# Patient Record
Sex: FEMALE | Race: WHITE | NOT HISPANIC OR LATINO | Employment: OTHER | URBAN - METROPOLITAN AREA
[De-identification: names, ages, dates, MRNs, and addresses within clinical notes are randomized per-mention and may not be internally consistent; named-entity substitution may affect disease eponyms.]

---

## 2024-01-04 NOTE — TELEPHONE ENCOUNTER
"Action 1/4/24 MV 11.11am   Action Taken IB sent to [facility] to see if any neuro notes     Action 1/4/24 MV 1.34pm   Action Taken Per [facility]:  \"There may be nothing she is calling me at 1 to finish registration, Dr. Gates told me to add her on and didn't specify what the appointment was for. I will find out more. \"    Waiting on more information.         RECORDS RECEIVED FROM: self   REASON FOR VISIT: ataxia   Date of Appt: 1/5/24   NOTES (FOR ALL VISITS) STATUS DETAILS   OFFICE NOTE from referring provider In process    OFFICE NOTE from other specialist In process    DISCHARGE SUMMARY from hospital In process    DISCHARGE REPORT from the ER In process    OPERATIVE REPORT In process    [facility] (MS ONLY) In process    EMG In process    EEG In process    MEDICATION LIST In process    IMAGING  (FOR ALL VISITS)     LUMBAR PUNCTURE In process    KYRA SCAN (MOVEMENT) In process    ULTRASOUND (CAROTID BILAT) *VASCULAR* In process    MRI (HEAD, NECK, SPINE) In process    CT (HEAD, NECK, SPINE) In process [facility]:  CT Head 1/27/00        "

## 2024-01-05 ENCOUNTER — OFFICE VISIT (OUTPATIENT)
Dept: NEUROLOGY | Facility: CLINIC | Age: 64
End: 2024-01-05
Payer: COMMERCIAL

## 2024-01-05 ENCOUNTER — PRE VISIT (OUTPATIENT)
Dept: NEUROLOGY | Facility: CLINIC | Age: 64
End: 2024-01-05

## 2024-01-05 VITALS
OXYGEN SATURATION: 99 % | RESPIRATION RATE: 18 BRPM | HEIGHT: 63 IN | SYSTOLIC BLOOD PRESSURE: 129 MMHG | HEART RATE: 73 BPM | WEIGHT: 144.3 LBS | BODY MASS INDEX: 25.57 KG/M2 | DIASTOLIC BLOOD PRESSURE: 79 MMHG

## 2024-01-05 DIAGNOSIS — F32.A DEPRESSION, UNSPECIFIED DEPRESSION TYPE: Primary | ICD-10-CM

## 2024-01-05 PROCEDURE — 99417 PROLNG OP E/M EACH 15 MIN: CPT | Performed by: PSYCHIATRY & NEUROLOGY

## 2024-01-05 PROCEDURE — 99205 OFFICE O/P NEW HI 60 MIN: CPT | Performed by: PSYCHIATRY & NEUROLOGY

## 2024-01-05 RX ORDER — LORAZEPAM 0.5 MG/1
1 TABLET ORAL AT BEDTIME
COMMUNITY
Start: 2023-12-29
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
## 2024-05-07 ENCOUNTER — TRANSFERRED RECORDS (OUTPATIENT)
Dept: HEALTH INFORMATION MANAGEMENT | Facility: CLINIC | Age: 64
End: 2024-05-07
Payer: COMMERCIAL

## 2024-05-09 ENCOUNTER — DOCUMENTATION ONLY (OUTPATIENT)
Dept: NEUROLOGY | Facility: CLINIC | Age: 64
End: 2024-05-09
Payer: COMMERCIAL

## 2024-05-09 NOTE — PROGRESS NOTES
Received office note from allergy asthma  Records Date of service: 5/7/24  Copy has been sent to scanning and sent to nurse

## 2024-06-06 ENCOUNTER — TRANSFERRED RECORDS (OUTPATIENT)
Dept: HEALTH INFORMATION MANAGEMENT | Facility: CLINIC | Age: 64
End: 2024-06-06
Payer: COMMERCIAL

## 2024-06-21 ENCOUNTER — DOCUMENTATION ONLY (OUTPATIENT)
Dept: NEUROLOGY | Facility: CLINIC | Age: 64
End: 2024-06-21
Payer: COMMERCIAL

## 2024-06-21 NOTE — PROGRESS NOTES
[place] office note from [facility]    Records Date of service: 6/7/24  Copy has been sent to scanning and encounter routed to nurse .

## 2025-04-06 ENCOUNTER — HEALTH MAINTENANCE LETTER (OUTPATIENT)
Age: 65
End: 2025-04-06